# Patient Record
Sex: MALE | NOT HISPANIC OR LATINO
[De-identification: names, ages, dates, MRNs, and addresses within clinical notes are randomized per-mention and may not be internally consistent; named-entity substitution may affect disease eponyms.]

---

## 2023-10-06 ENCOUNTER — P2P PATIENT RECORD (OUTPATIENT)
Age: 73
End: 2023-10-06

## 2023-10-23 ENCOUNTER — LAB OUTSIDE AN ENCOUNTER (OUTPATIENT)
Dept: URBAN - METROPOLITAN AREA CLINIC 70 | Facility: CLINIC | Age: 73
End: 2023-10-23

## 2023-10-23 ENCOUNTER — OFFICE VISIT (OUTPATIENT)
Dept: URBAN - METROPOLITAN AREA CLINIC 70 | Facility: CLINIC | Age: 73
End: 2023-10-23
Payer: COMMERCIAL

## 2023-10-23 VITALS
TEMPERATURE: 98.2 F | HEIGHT: 64 IN | SYSTOLIC BLOOD PRESSURE: 141 MMHG | WEIGHT: 127.8 LBS | HEART RATE: 86 BPM | DIASTOLIC BLOOD PRESSURE: 57 MMHG | BODY MASS INDEX: 21.82 KG/M2

## 2023-10-23 DIAGNOSIS — Z12.11 COLON CANCER SCREENING: ICD-10-CM

## 2023-10-23 DIAGNOSIS — D50.9 IRON DEFICIENCY ANEMIA, UNSPECIFIED IRON DEFICIENCY ANEMIA TYPE: ICD-10-CM

## 2023-10-23 PROBLEM — 87522002: Status: ACTIVE | Noted: 2023-10-23

## 2023-10-23 PROCEDURE — 99204 OFFICE O/P NEW MOD 45 MIN: CPT | Performed by: NURSE PRACTITIONER

## 2023-10-23 RX ORDER — HYDRALAZINE HYDROCHLORIDE 50 MG/1
TABLET ORAL
Qty: 270 TABLET | Status: ACTIVE | COMMUNITY

## 2023-10-23 RX ORDER — FERROUS SULFATE 325(65) MG
1 TABLET TABLET ORAL
Status: ACTIVE | COMMUNITY

## 2023-10-23 RX ORDER — LEVOTHYROXINE SODIUM 88 UG/1
TABLET ORAL
Qty: 90 TABLET | Status: ACTIVE | COMMUNITY

## 2023-10-23 RX ORDER — EMPAGLIFLOZIN 25 MG/1
TABLET, FILM COATED ORAL
Qty: 90 TABLET | Status: ACTIVE | COMMUNITY

## 2023-10-23 RX ORDER — METFORMIN ER 500 MG 500 MG/1
TABLET ORAL
Qty: 360 TABLET | Status: ACTIVE | COMMUNITY

## 2023-10-23 RX ORDER — GLIPIZIDE 5 MG/1
TABLET ORAL
Qty: 60 TABLET | Status: ON HOLD | COMMUNITY

## 2023-10-23 RX ORDER — LIFITEGRAST 50 MG/ML
SOLUTION/ DROPS OPHTHALMIC
Qty: 60 EACH | Status: ACTIVE | COMMUNITY

## 2023-10-23 RX ORDER — LANSOPRAZOLE 30 MG/1
CAPSULE, DELAYED RELEASE PELLETS ORAL
Qty: 90 CAPSULE | Status: ACTIVE | COMMUNITY

## 2023-10-23 RX ORDER — FUROSEMIDE 20 MG/1
1 TABLET TABLET ORAL ONCE A DAY
Status: ACTIVE | COMMUNITY

## 2023-10-23 NOTE — HPI-TODAY'S VISIT:
Patient is a 72 y/o male who presents today for YAMILKA. Lab by PCP 06/22/23 showed H/H 7.6/28.1, MCV 71, iron 18, iron sat 4, ferritin 4 and repeat labs 9/19/23 showed no improvement with H/H 7.6/28.9, MCV 67. He was referred to hematology.  He reports a hx of anemia years ago and was given iron. He is current taking iron supplement again. He is vegan. There are no active signs of GI bleeding such as hematemesis, melena, or hematochezia. No NSAIDs. No prior EGD/colonoscopy. No Fhx of colon cancer. Currently w/o GI complaints. Denies CP, SOB, dizziness, wt loss, abdominal pain, N/V, dysphagia, constipation, or diarrhea. VSS.y

## 2023-11-10 ENCOUNTER — OUT OF OFFICE VISIT (OUTPATIENT)
Dept: URBAN - METROPOLITAN AREA SURGERY CENTER 24 | Facility: SURGERY CENTER | Age: 73
End: 2023-11-10
Payer: COMMERCIAL

## 2023-11-10 ENCOUNTER — CLAIMS CREATED FROM THE CLAIM WINDOW (OUTPATIENT)
Dept: URBAN - METROPOLITAN AREA CLINIC 4 | Facility: CLINIC | Age: 73
End: 2023-11-10
Payer: COMMERCIAL

## 2023-11-10 DIAGNOSIS — D50.9 ANEMIA: ICD-10-CM

## 2023-11-10 DIAGNOSIS — D12.4 ADENOMA OF DESCENDING COLON: ICD-10-CM

## 2023-11-10 DIAGNOSIS — D12.0 ADENOMATOUS POLYP OF CECUM: ICD-10-CM

## 2023-11-10 DIAGNOSIS — D12.0 BENIGN NEOPLASM OF CECUM: ICD-10-CM

## 2023-11-10 DIAGNOSIS — D50.9 IRON DEFICIENCY ANEMIA, UNSPECIFIED: ICD-10-CM

## 2023-11-10 DIAGNOSIS — D12.0 ADENOMA OF CECUM: ICD-10-CM

## 2023-11-10 DIAGNOSIS — D12.4 ADENOMATOUS POLYP OF DESCENDING COLON: ICD-10-CM

## 2023-11-10 PROCEDURE — 45385 COLONOSCOPY W/LESION REMOVAL: CPT | Performed by: INTERNAL MEDICINE

## 2023-11-10 PROCEDURE — 00811 ANES LWR INTST NDSC NOS: CPT | Performed by: NURSE ANESTHETIST, CERTIFIED REGISTERED

## 2023-11-10 PROCEDURE — G8907 PT DOC NO EVENTS ON DISCHARG: HCPCS | Performed by: INTERNAL MEDICINE

## 2023-11-10 PROCEDURE — 88305 TISSUE EXAM BY PATHOLOGIST: CPT | Performed by: PATHOLOGY

## 2023-11-10 RX ORDER — HYDRALAZINE HYDROCHLORIDE 50 MG/1
TABLET ORAL
Qty: 270 TABLET | Status: ACTIVE | COMMUNITY

## 2023-11-10 RX ORDER — EMPAGLIFLOZIN 25 MG/1
TABLET, FILM COATED ORAL
Qty: 90 TABLET | Status: ACTIVE | COMMUNITY

## 2023-11-10 RX ORDER — LANSOPRAZOLE 30 MG/1
CAPSULE, DELAYED RELEASE PELLETS ORAL
Qty: 90 CAPSULE | Status: ACTIVE | COMMUNITY

## 2023-11-10 RX ORDER — FUROSEMIDE 20 MG/1
1 TABLET TABLET ORAL ONCE A DAY
Status: ACTIVE | COMMUNITY

## 2023-11-10 RX ORDER — GLIPIZIDE 5 MG/1
TABLET ORAL
Qty: 60 TABLET | Status: ON HOLD | COMMUNITY

## 2023-11-10 RX ORDER — LIFITEGRAST 50 MG/ML
SOLUTION/ DROPS OPHTHALMIC
Qty: 60 EACH | Status: ACTIVE | COMMUNITY

## 2023-11-10 RX ORDER — LEVOTHYROXINE SODIUM 88 UG/1
TABLET ORAL
Qty: 90 TABLET | Status: ACTIVE | COMMUNITY

## 2023-11-10 RX ORDER — METFORMIN ER 500 MG 500 MG/1
TABLET ORAL
Qty: 360 TABLET | Status: ACTIVE | COMMUNITY

## 2023-11-10 RX ORDER — FERROUS SULFATE 325(65) MG
1 TABLET TABLET ORAL
Status: ACTIVE | COMMUNITY

## 2023-12-15 ENCOUNTER — DASHBOARD ENCOUNTERS (OUTPATIENT)
Age: 73
End: 2023-12-15

## 2023-12-15 ENCOUNTER — OFFICE VISIT (OUTPATIENT)
Dept: URBAN - METROPOLITAN AREA CLINIC 70 | Facility: CLINIC | Age: 73
End: 2023-12-15
Payer: COMMERCIAL

## 2023-12-15 VITALS
HEART RATE: 56 BPM | DIASTOLIC BLOOD PRESSURE: 68 MMHG | WEIGHT: 132.8 LBS | TEMPERATURE: 97.7 F | SYSTOLIC BLOOD PRESSURE: 149 MMHG | HEIGHT: 64 IN | BODY MASS INDEX: 22.67 KG/M2

## 2023-12-15 DIAGNOSIS — Z86.010 HISTORY OF COLON POLYPS: ICD-10-CM

## 2023-12-15 DIAGNOSIS — D50.9 IRON DEFICIENCY ANEMIA, UNSPECIFIED IRON DEFICIENCY ANEMIA TYPE: ICD-10-CM

## 2023-12-15 PROBLEM — 428283002: Status: ACTIVE | Noted: 2023-12-15

## 2023-12-15 PROCEDURE — 99214 OFFICE O/P EST MOD 30 MIN: CPT | Performed by: NURSE PRACTITIONER

## 2023-12-15 RX ORDER — LANSOPRAZOLE 30 MG/1
CAPSULE, DELAYED RELEASE PELLETS ORAL
Qty: 90 CAPSULE | Status: ACTIVE | COMMUNITY

## 2023-12-15 RX ORDER — FERROUS SULFATE 325(65) MG
1 TABLET TABLET ORAL
Status: ACTIVE | COMMUNITY

## 2023-12-15 RX ORDER — HYDRALAZINE HYDROCHLORIDE 50 MG/1
TABLET ORAL
Qty: 270 TABLET | Status: ACTIVE | COMMUNITY

## 2023-12-15 RX ORDER — LEVOTHYROXINE SODIUM 88 UG/1
TABLET ORAL
Qty: 90 TABLET | Status: ACTIVE | COMMUNITY

## 2023-12-15 RX ORDER — EMPAGLIFLOZIN 25 MG/1
TABLET, FILM COATED ORAL
Qty: 90 TABLET | Status: ACTIVE | COMMUNITY

## 2023-12-15 RX ORDER — GLIPIZIDE 5 MG/1
TABLET ORAL
Qty: 60 TABLET | Status: ON HOLD | COMMUNITY

## 2023-12-15 RX ORDER — LIFITEGRAST 50 MG/ML
SOLUTION/ DROPS OPHTHALMIC
Qty: 60 EACH | Status: ACTIVE | COMMUNITY

## 2023-12-15 RX ORDER — FUROSEMIDE 20 MG/1
1 TABLET TABLET ORAL ONCE A DAY
Status: ACTIVE | COMMUNITY

## 2023-12-15 RX ORDER — METFORMIN ER 500 MG 500 MG/1
TABLET ORAL
Qty: 360 TABLET | Status: ACTIVE | COMMUNITY

## 2023-12-15 NOTE — HPI-TODAY'S VISIT:
OV 10/23/23: Patient is a 72 y/o male who presents today for YAMILKA. Lab by PCP 06/22/23 showed H/H 7.6/28.1, MCV 71, iron 18, iron sat 4, ferritin 4 and repeat labs 9/19/23 showed no improvement with H/H 7.6/28.9, MCV 67. He was referred to hematology.  He reports a hx of anemia years ago and was given iron. He is current taking iron supplement again. He is vegan. There are no active signs of GI bleeding such as hematemesis, melena, or hematochezia. No NSAIDs. No prior EGD/colonoscopy. No Fhx of colon cancer. Currently w/o GI complaints. Denies CP, SOB, dizziness, wt loss, abdominal pain, N/V, dysphagia, constipation, or diarrhea. VSS. -------------------------- Today 12/15/23: Patient presents today for follow up. Underwent EGD/colonoscopy 11/10/23 with findings of normal EGD and multiple colon polyps including 20mm cecal polyp (TA) with recall in 1 year but no obvious source of anemia. Results discussed with patient. He reports doing well. Is s/p iron infusions and has pending f/u with hematology. No active signs of GI bleeding or current GI complaints.

## 2024-03-15 ENCOUNTER — OV EP (OUTPATIENT)
Dept: URBAN - METROPOLITAN AREA CLINIC 70 | Facility: CLINIC | Age: 74
End: 2024-03-15